# Patient Record
Sex: MALE | Race: WHITE | NOT HISPANIC OR LATINO | Employment: FULL TIME | URBAN - METROPOLITAN AREA
[De-identification: names, ages, dates, MRNs, and addresses within clinical notes are randomized per-mention and may not be internally consistent; named-entity substitution may affect disease eponyms.]

---

## 2022-04-07 ENCOUNTER — APPOINTMENT (EMERGENCY)
Dept: RADIOLOGY | Facility: HOSPITAL | Age: 57
End: 2022-04-07
Payer: OTHER MISCELLANEOUS

## 2022-04-07 ENCOUNTER — HOSPITAL ENCOUNTER (EMERGENCY)
Facility: HOSPITAL | Age: 57
Discharge: HOME | End: 2022-04-07
Attending: EMERGENCY MEDICINE
Payer: OTHER MISCELLANEOUS

## 2022-04-07 VITALS
OXYGEN SATURATION: 98 % | HEART RATE: 94 BPM | SYSTOLIC BLOOD PRESSURE: 196 MMHG | RESPIRATION RATE: 20 BRPM | DIASTOLIC BLOOD PRESSURE: 98 MMHG | TEMPERATURE: 97.8 F

## 2022-04-07 DIAGNOSIS — T14.90XA INHALATION INJURY: Primary | ICD-10-CM

## 2022-04-07 LAB
ANION GAP SERPL CALCULATED.3IONS-SCNC: 9 MMOL/L (ref 4–13)
BASOPHILS # BLD MANUAL: 0.16 THOUSAND/UL (ref 0–0.1)
BASOPHILS NFR MAR MANUAL: 2 % (ref 0–1)
BUN SERPL-MCNC: 26 MG/DL (ref 5–25)
CALCIUM SERPL-MCNC: 9.4 MG/DL (ref 8.3–10.1)
CARDIAC TROPONIN I PNL SERPL HS: 2 NG/L
CHLORIDE SERPL-SCNC: 105 MMOL/L (ref 100–108)
CO2 SERPL-SCNC: 27 MMOL/L (ref 21–32)
CREAT SERPL-MCNC: 1.3 MG/DL (ref 0.6–1.3)
EOSINOPHIL # BLD MANUAL: 0.41 THOUSAND/UL (ref 0–0.4)
EOSINOPHIL NFR BLD MANUAL: 5 % (ref 0–6)
ERYTHROCYTE [DISTWIDTH] IN BLOOD BY AUTOMATED COUNT: 13.3 % (ref 11.6–15.1)
GFR SERPL CREATININE-BSD FRML MDRD: 61 ML/MIN/1.73SQ M
GLUCOSE SERPL-MCNC: 108 MG/DL (ref 65–140)
HCT VFR BLD AUTO: 43.8 % (ref 36.5–49.3)
HGB BLD-MCNC: 14.8 G/DL (ref 12–17)
LYMPHOCYTES # BLD AUTO: 2.6 THOUSAND/UL (ref 0.6–4.47)
LYMPHOCYTES # BLD AUTO: 32 % (ref 14–44)
MCH RBC QN AUTO: 29.6 PG (ref 26.8–34.3)
MCHC RBC AUTO-ENTMCNC: 33.8 G/DL (ref 31.4–37.4)
MCV RBC AUTO: 88 FL (ref 82–98)
MONOCYTES # BLD AUTO: 0.89 THOUSAND/UL (ref 0–1.22)
MONOCYTES NFR BLD: 11 % (ref 4–12)
NEUTROPHILS # BLD MANUAL: 4.06 THOUSAND/UL (ref 1.85–7.62)
NEUTS BAND NFR BLD MANUAL: 2 % (ref 0–8)
NEUTS SEG NFR BLD AUTO: 48 % (ref 43–75)
PLATELET # BLD AUTO: 342 THOUSANDS/UL (ref 149–390)
PLATELET BLD QL SMEAR: ADEQUATE
PMV BLD AUTO: 9.8 FL (ref 8.9–12.7)
POTASSIUM SERPL-SCNC: 4.6 MMOL/L (ref 3.5–5.3)
RBC # BLD AUTO: 5 MILLION/UL (ref 3.88–5.62)
RBC MORPH BLD: NORMAL
SODIUM SERPL-SCNC: 141 MMOL/L (ref 136–145)
WBC # BLD AUTO: 8.11 THOUSAND/UL (ref 4.31–10.16)

## 2022-04-07 PROCEDURE — 85007 BL SMEAR W/DIFF WBC COUNT: CPT | Performed by: PHYSICIAN ASSISTANT

## 2022-04-07 PROCEDURE — 85027 COMPLETE CBC AUTOMATED: CPT | Performed by: PHYSICIAN ASSISTANT

## 2022-04-07 PROCEDURE — 71046 X-RAY EXAM CHEST 2 VIEWS: CPT

## 2022-04-07 PROCEDURE — 84484 ASSAY OF TROPONIN QUANT: CPT | Performed by: PHYSICIAN ASSISTANT

## 2022-04-07 PROCEDURE — 80048 BASIC METABOLIC PNL TOTAL CA: CPT | Performed by: PHYSICIAN ASSISTANT

## 2022-04-07 PROCEDURE — 99284 EMERGENCY DEPT VISIT MOD MDM: CPT

## 2022-04-07 PROCEDURE — 36415 COLL VENOUS BLD VENIPUNCTURE: CPT | Performed by: PHYSICIAN ASSISTANT

## 2022-04-07 PROCEDURE — 99285 EMERGENCY DEPT VISIT HI MDM: CPT | Performed by: PHYSICIAN ASSISTANT

## 2022-04-07 PROCEDURE — 93005 ELECTROCARDIOGRAM TRACING: CPT

## 2022-04-07 RX ORDER — ALBUTEROL SULFATE 90 UG/1
2 AEROSOL, METERED RESPIRATORY (INHALATION) ONCE
Status: COMPLETED | OUTPATIENT
Start: 2022-04-07 | End: 2022-04-07

## 2022-04-07 RX ADMIN — ALBUTEROL SULFATE 2 PUFF: 90 AEROSOL, METERED RESPIRATORY (INHALATION) at 09:05

## 2022-04-07 NOTE — ED PROVIDER NOTES
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 and does have difficulty lying  States that it is not believed his flare provided adequate PE  States that he felt like he was breathing and small particles and had a heaviness in his chest when he went home yesterday  Since then he had several episodes of coughing and 1 time this morning felt as if he were to vomit after coughing  States that he went to work today trying to finish the job that he developed a cough when he started sanding again  No fevers  Denies shortness of breath at rest       History provided by:  Patient   used: No        Prior to Admission Medications   Prescriptions Last Dose Informant Patient Reported? Taking? EPINEPHrine (EPIPEN) 0 3 mg/0 3 mL SOAJ   No Yes   Sig: Inject 0 3 mL into the shoulder, thigh, or buttocks once for 1 dose For severe allergic reaction      Facility-Administered Medications: None       History reviewed  No pertinent past medical history  Past Surgical History:   Procedure Laterality Date    FRACTURE SURGERY         History reviewed  No pertinent family history  I have reviewed and agree with the history as documented      E-Cigarette/Vaping    E-Cigarette Use Never User      E-Cigarette/Vaping Substances     Social History     Tobacco Use    Smoking status: Never Smoker    Smokeless tobacco: Never Used   Vaping Use    Vaping Use: Never used   Substance Use Topics    Alcohol use: Yes     Comment: beer    Drug use: Never       Review of Systems   Constitutional: Negative for activity change, appetite change, chills and fever  HENT: Negative for congestion, dental problem, drooling, ear discharge, ear pain, mouth sores, nosebleeds, rhinorrhea, sore throat and trouble swallowing  Eyes: Negative for pain, discharge and itching  Respiratory: Positive for chest tightness  Negative for cough, shortness of breath and wheezing  Cardiovascular: Negative for chest pain and palpitations  Gastrointestinal: Negative for abdominal pain, blood in stool, constipation, diarrhea, nausea and vomiting  Endocrine: Negative for cold intolerance and heat intolerance  Genitourinary: Negative for difficulty urinating, dysuria, flank pain, frequency and urgency  Skin: Negative for rash and wound  Allergic/Immunologic: Negative for food allergies and immunocompromised state  Neurological: Negative for dizziness, seizures, syncope, weakness, numbness and headaches  Psychiatric/Behavioral: Negative for agitation, behavioral problems and confusion  Physical Exam  Physical Exam  Vitals and nursing note reviewed  Constitutional:       General: He is not in acute distress  Appearance: He is not diaphoretic  HENT:      Head: Normocephalic and atraumatic  Right Ear: External ear normal       Left Ear: External ear normal    Eyes:      General: No scleral icterus  Conjunctiva/sclera: Conjunctivae normal    Neck:      Vascular: No JVD  Trachea: No tracheal deviation  Cardiovascular:      Rate and Rhythm: Normal rate and regular rhythm  Heart sounds: Normal heart sounds  No murmur heard  No friction rub  No gallop  Pulmonary:      Effort: Pulmonary effort is normal  No respiratory distress  Breath sounds: Normal breath sounds  No wheezing or rales  Chest:      Chest wall: No tenderness  Abdominal:      General: There is no distension  Musculoskeletal:         General: No tenderness or deformity  Normal range of motion     Lymphadenopathy:      Cervical: No cervical adenopathy  Skin:     General: Skin is warm and dry  Findings: No erythema or rash  Neurological:      Mental Status: He is alert and oriented to person, place, and time  Psychiatric:         Mood and Affect: Mood normal          Behavior: Behavior normal          Vital Signs  ED Triage Vitals   Temperature Pulse Respirations Blood Pressure SpO2   04/07/22 0817 04/07/22 0817 04/07/22 0817 04/07/22 0817 04/07/22 0817   97 8 °F (36 6 °C) 98 18 (!) 196/98 99 %      Temp Source Heart Rate Source Patient Position - Orthostatic VS BP Location FiO2 (%)   04/07/22 0817 04/07/22 0817 04/07/22 0817 04/07/22 0817 --   Oral Monitor Sitting Right arm       Pain Score       04/07/22 1025       No Pain           Vitals:    04/07/22 0817 04/07/22 0900 04/07/22 1000   BP: (!) 196/98     Pulse: 98 92 94   Patient Position - Orthostatic VS: Sitting           Visual Acuity      ED Medications  Medications   albuterol (PROVENTIL HFA,VENTOLIN HFA) inhaler 2 puff (2 puffs Inhalation Given 4/7/22 0905)       Diagnostic Studies  Results Reviewed     Procedure Component Value Units Date/Time    HS Troponin 0hr (reflex protocol) [[id number]]  (Normal) Collected: 04/07/22 0857    Lab Status: Final result Specimen: Blood from Arm, Right Updated: 04/07/22 0945     hs TnI 0hr 2 ng/L     CBC and differential [[id number]]  (Normal) Collected: 04/07/22 0857    Lab Status: Final result Specimen: Blood from Arm, Right Updated: 04/07/22 0940     WBC 8 11 Thousand/uL      RBC 5 00 Million/uL      Hemoglobin 14 8 g/dL      Hematocrit 43 8 %      MCV 88 fL      MCH 29 6 pg      MCHC 33 8 g/dL      RDW 13 3 %      MPV 9 8 fL      Platelets 291 Thousands/uL     Narrative: This is an appended report  These results have been appended to a previously verified report      Manual Differential(PHLEBS Do Not Order) [[id number]]  (Abnormal) Collected: 04/07/22 0857    Lab Status: Final result Specimen: Blood from Arm, Right Updated: 04/07/22 0940 Segmented % 48 %      Bands % 2 %      Lymphocytes % 32 %      Monocytes % 11 %      Eosinophils, % 5 %      Basophils % 2 %      Absolute Neutrophils 4 06 Thousand/uL      Lymphocytes Absolute 2 60 Thousand/uL      Monocytes Absolute 0 89 Thousand/uL      Eosinophils Absolute 0 41 Thousand/uL      Basophils Absolute 0 16 Thousand/uL      Total Counted --     RBC Morphology Normal     Platelet Estimate Adequate    Basic metabolic panel [[id number]]  (Abnormal) Collected: 04/07/22 0857    Lab Status: Final result Specimen: Blood from Arm, Right Updated: 04/07/22 0930     Sodium 141 mmol/L      Potassium 4 6 mmol/L      Chloride 105 mmol/L      CO2 27 mmol/L      ANION GAP 9 mmol/L      BUN 26 mg/dL      Creatinine 1 30 mg/dL      Glucose 108 mg/dL      Calcium 9 4 mg/dL      eGFR 61 ml/min/1 73sq m     Narrative:      [place] guidelines for Chronic Kidney Disease (CKD):     Stage 1 with normal or high GFR (GFR > 90 mL/min/1 73 square meters)    Stage 2 Mild CKD (GFR = 60-89 mL/min/1 73 square meters)    Stage 3A Moderate CKD (GFR = 45-59 mL/min/1 73 square meters)    Stage 3B Moderate CKD (GFR = 30-44 mL/min/1 73 square meters)    Stage 4 Severe CKD (GFR = 15-29 mL/min/1 73 square meters)    Stage 5 End Stage CKD (GFR <15 mL/min/1 73 square meters)  Note: GFR calculation is accurate only with a steady state creatinine                 XR chest 2 views   Final Result by Mckinley Petersen MD (04/07 0915)      No acute cardiopulmonary disease                    Workstation performed: [id number]                    Procedures  ECG 12 Lead Documentation Only    Date/Time: 4/7/2022 2:10 PM  Performed by: Ryan Castro PA-C  Authorized by: Ryan Castro PA-C     Indications / Diagnosis:  SOB   ECG reviewed by me, the ED Provider: yes    Patient location:  ED  Previous ECG:     Previous ECG:  Unavailable  Interpretation:     Interpretation: non-specific    Rate:     ECG rate:  94    ECG rate assessment: tachycardic    Rhythm:     Rhythm: sinus tachycardia    Ectopy:     Ectopy: none    QRS:     QRS axis:  Normal    QRS intervals:  Normal  Conduction:     Conduction: normal    ST segments:     ST segments:  Normal  T waves:     T waves: normal               ED Course             HEART Risk Score      Most Recent Value   Heart Score Risk Calculator    History 1 Filed at: 04/07/2022 0956   ECG 0 Filed at: 04/07/2022 5506   Age 1 Filed at: 04/07/2022 0956   Risk Factors 0 Filed at: 04/07/2022 0956   Troponin 0 Filed at: 04/07/2022 9921   HEART Score 2 Filed at: 04/07/2022 3807                                      MDM  Number of Diagnoses or Management Options  Inhalation injury  Diagnosis management comments: Differential diagnosis includes but not limited to:  Reactive airway, bronchospasm  Acute coronary syndrome less likely  Amount and/or Complexity of Data Reviewed  Clinical lab tests: ordered and reviewed  Tests in the radiology section of CPT®: reviewed and ordered  Independent visualization of images, tracings, or specimens: yes        Disposition  Final diagnoses:   Inhalation injury     Time reflects when diagnosis was documented in both MDM as applicable and the Disposition within this note     Time User Action Codes Description Comment    4/7/2022  9:57 AM Omar Bishop [[id number]] Inhalation injury       ED Disposition     ED Disposition Condition Date/Time Comment    Discharge Stable Thu Apr 7, 2022  9:56 AM Maggie Hernández discharge to home/self care  Follow-up Information     Follow up With Specialties Details Why Almas Price MD Internal Medicine   [address] [facility] [address] [facility]  [phone]            Discharge Medication List as of 4/7/2022 10:00 AM      CONTINUE these medications which have NOT CHANGED    Details   EPINEPHrine (EPIPEN) 0 3 mg/0 3 mL SOAJ Inject 0 3 mL into the shoulder, thigh, or buttocks once for 1 dose For severe allergic reaction, Starting Thu 10/13/2016, Print             No discharge procedures on file      PDMP Review     None          ED Provider  Electronically Signed by           Luis Antonio Pichardo PA-C  04/07/22 6035

## 2022-04-07 NOTE — Clinical Note
Susan Amin was seen and treated in our emergency department on 4/7/2022  Diagnosis:     Jerry Larios  may return to work on return date  He may return on this date: 04/11/2022         If you have any questions or concerns, please don't hesitate to call        Daryle Hackney, PA-C    ______________________________           _______________          _______________  Hospital Representative                              Date                                Time

## 2022-04-08 LAB
ATRIAL RATE: 98 BPM
P AXIS: 66 DEGREES
PR INTERVAL: 142 MS
QRS AXIS: 18 DEGREES
QRSD INTERVAL: 98 MS
QT INTERVAL: 334 MS
QTC INTERVAL: 418 MS
T WAVE AXIS: 10 DEGREES
VENTRICULAR RATE: 94 BPM

## 2022-04-08 PROCEDURE — 93010 ELECTROCARDIOGRAM REPORT: CPT | Performed by: INTERNAL MEDICINE

## 2022-04-10 ENCOUNTER — APPOINTMENT (OUTPATIENT)
Dept: URGENT CARE | Age: 57
End: 2022-04-10
Payer: COMMERCIAL

## 2022-04-10 DIAGNOSIS — T14.90XA INHALATION INJURY: Primary | ICD-10-CM

## 2022-04-10 PROCEDURE — 99213 OFFICE O/P EST LOW 20 MIN: CPT

## 2022-04-10 RX ORDER — ALBUTEROL SULFATE 2.5 MG/3ML
2.5 SOLUTION RESPIRATORY (INHALATION) ONCE
Status: SHIPPED | OUTPATIENT
Start: 2022-04-10

## 2022-04-10 RX ORDER — SODIUM CHLORIDE FOR INHALATION 0.9 %
3 VIAL, NEBULIZER (ML) INHALATION ONCE
Status: SHIPPED | OUTPATIENT
Start: 2022-04-10

## 2022-06-13 ENCOUNTER — APPOINTMENT (OUTPATIENT)
Dept: URGENT CARE | Age: 57
End: 2022-06-13
Payer: OTHER MISCELLANEOUS

## 2022-06-13 PROCEDURE — 99213 OFFICE O/P EST LOW 20 MIN: CPT | Performed by: NURSE PRACTITIONER

## 2022-06-23 ENCOUNTER — CONSULT (OUTPATIENT)
Dept: PULMONOLOGY | Facility: CLINIC | Age: 57
End: 2022-06-23
Payer: OTHER MISCELLANEOUS

## 2022-06-23 VITALS
DIASTOLIC BLOOD PRESSURE: 80 MMHG | BODY MASS INDEX: 27.97 KG/M2 | TEMPERATURE: 97.9 F | WEIGHT: 178.2 LBS | HEART RATE: 71 BPM | HEIGHT: 67 IN | OXYGEN SATURATION: 99 % | SYSTOLIC BLOOD PRESSURE: 150 MMHG

## 2022-06-23 DIAGNOSIS — J68.3 REACTIVE AIRWAYS DYSFUNCTION SYNDROME (HCC): Primary | ICD-10-CM

## 2022-06-23 PROCEDURE — 99204 OFFICE O/P NEW MOD 45 MIN: CPT | Performed by: INTERNAL MEDICINE

## 2022-06-23 RX ORDER — ALBUTEROL SULFATE 90 UG/1
AEROSOL, METERED RESPIRATORY (INHALATION)
COMMUNITY
Start: 2022-06-13 | End: 2022-08-03 | Stop reason: SDUPTHER

## 2022-06-25 PROBLEM — J68.3 REACTIVE AIRWAYS DYSFUNCTION SYNDROME (HCC): Status: ACTIVE | Noted: 2022-06-25

## 2022-06-25 NOTE — PROGRESS NOTES
Pulmonary Consultation   Mariela Fidelia Hernández 49 y o  male MRN: [id number]  6/25/2022    Assessment:    Reactive airways dysfunction syndrome ([facility] )  Jasbir Chavez reports the sudden but persistent onset of respiratory difficulties after exposure to metal particles, dust, and debris  This seems likely to be reactive airways dysfunction syndrome  · Trial Breo 200/25  · Continue to use rescue inhalers as needed  · Collect PFTs    Plan:    Diagnoses and all orders for this visit:    Reactive airways dysfunction syndrome ([facility] )  -     Complete PFT with post bronchodilator; Future  -     fluticasone-vilanterol (Breo Ellipta) 200-25 MCG/INH inhaler; Inhale 1 puff daily Rinse mouth after use  Other orders  -     albuterol (PROVENTIL HFA,VENTOLIN HFA) 90 mcg/act inhaler; TAKE 2 PUFFS BY MOUTH EVERY 4 TO 6 HOURS AS NEEDED      No follow-ups on file  History of Present Illness   HPI:  Cindy Lau is a 49 y o  male who presents for evaluation of sudden onset dyspnea with exertion  He reports 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, 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  He previously was able to bike 8-10 miles for exercise and now finds himself short of breath just climbing half a flight of stairs  This has been persistent since the initial presentation and has not really worsened or gotten better since that time  He has tried albuterol which has helped a bit, but he still finds he can recover likely prior to the rescue inhaler becoming effective  He does report wheezing and a dry cough as well  He has no other significant medical history and is in good cardiovascular health otherwise  No major surgeries  He never smoked  There is no significant family history reported  Review of Systems   Respiratory: Positive for cough and shortness of breath  All other systems reviewed and are negative  Historical Information   History reviewed   No pertinent past medical history  Past Surgical History:   Procedure Laterality Date    FRACTURE SURGERY       History reviewed  No pertinent family history  Meds/Allergies     Current Outpatient Medications:     albuterol (PROVENTIL HFA,VENTOLIN HFA) 90 mcg/act inhaler, TAKE 2 PUFFS BY MOUTH EVERY 4 TO 6 HOURS AS NEEDED, Disp: , Rfl:     fluticasone-vilanterol (Breo Ellipta) 200-25 MCG/INH inhaler, Inhale 1 puff daily Rinse mouth after use , Disp: 60 blister, Rfl: 2    EPINEPHrine (EPIPEN) 0 3 mg/0 3 mL SOAJ, Inject 0 3 mL into the shoulder, thigh, or buttocks once for 1 dose For severe allergic reaction, Disp: 1 each, Rfl: 1    Current Facility-Administered Medications:     albuterol inhalation solution 2 5 mg, 2 5 mg, Nebulization, Once, Trevor Gerold, CRNP    sodium chloride 0 9 % inhalation solution 3 mL, 3 mL, Nebulization, Once, Trevor Gerold, CRNP  Allergies   Allergen Reactions    Penicillins        Vitals: Blood pressure 150/80, pulse 71, temperature 97 9 °F (36 6 °C), height 5' 7" (1 702 m), weight 80 8 kg (178 lb 3 2 oz), SpO2 99 %  Body mass index is 27 91 kg/m²  Oxygen Therapy  SpO2: 99 %  Oxygen Therapy: None (Room air)    Physical Exam  Physical Exam  Vitals reviewed  Constitutional:       General: He is not in acute distress  Appearance: Normal appearance  He is well-developed  He is not ill-appearing  HENT:      Head: Normocephalic and atraumatic  Eyes:      General: No scleral icterus  Conjunctiva/sclera: Conjunctivae normal    Neck:      Thyroid: No thyromegaly  Vascular: No JVD  Cardiovascular:      Rate and Rhythm: Normal rate and regular rhythm  Heart sounds: Normal heart sounds  No murmur heard  No friction rub  No gallop  Pulmonary:      Effort: Pulmonary effort is normal  No respiratory distress  Breath sounds: Normal breath sounds  No wheezing or rales  Musculoskeletal:      Cervical back: Neck supple  Right lower leg: No edema  Left lower leg: No edema  Skin:     General: Skin is warm and dry  Findings: No rash  Neurological:      General: No focal deficit present  Mental Status: He is alert and oriented to person, place, and time  Mental status is at baseline  Psychiatric:         Mood and Affect: Mood normal          Behavior: Behavior normal      Labs: I have personally reviewed pertinent lab results  Lab Results   Component Value Date    WBC 8 11 04/07/2022    HGB 14 8 04/07/2022    HCT 43 8 04/07/2022    MCV 88 04/07/2022     04/07/2022     Lab Results   Component Value Date    CALCIUM 9 4 04/07/2022    K 4 6 04/07/2022    CO2 27 04/07/2022     04/07/2022    BUN 26 (H) 04/07/2022    CREATININE 1 30 04/07/2022     No results found for: IGE  No results found for: ALT, AST, GGT, ALKPHOS, BILITOT    Imaging and other studies: I have personally reviewed pertinent films in PACS    EKG, Pathology, and Other Studies: I have personally reviewed pertinent reports  [facility]

## 2022-06-25 NOTE — ASSESSMENT & PLAN NOTE
Walter Colvin reports the sudden but persistent onset of respiratory difficulties after exposure to metal particles, dust, and debris  This seems likely to be reactive airways dysfunction syndrome      · Trial Breo 200/25  · Continue to use rescue inhalers as needed  · Collect PFTs

## 2022-07-13 ENCOUNTER — HOSPITAL ENCOUNTER (OUTPATIENT)
Dept: PULMONOLOGY | Facility: HOSPITAL | Age: 57
Discharge: HOME | End: 2022-07-13
Attending: INTERNAL MEDICINE
Payer: COMMERCIAL

## 2022-07-13 DIAGNOSIS — J68.3 REACTIVE AIRWAYS DYSFUNCTION SYNDROME (HCC): ICD-10-CM

## 2022-07-13 PROCEDURE — 94726 PLETHYSMOGRAPHY LUNG VOLUMES: CPT

## 2022-07-13 PROCEDURE — 94729 DIFFUSING CAPACITY: CPT | Performed by: INTERNAL MEDICINE

## 2022-07-13 PROCEDURE — 94726 PLETHYSMOGRAPHY LUNG VOLUMES: CPT | Performed by: INTERNAL MEDICINE

## 2022-07-13 PROCEDURE — 94060 EVALUATION OF WHEEZING: CPT

## 2022-07-13 PROCEDURE — 94060 EVALUATION OF WHEEZING: CPT | Performed by: INTERNAL MEDICINE

## 2022-07-13 PROCEDURE — 94760 N-INVAS EAR/PLS OXIMETRY 1: CPT

## 2022-07-13 PROCEDURE — 94729 DIFFUSING CAPACITY: CPT

## 2022-07-13 RX ORDER — ALBUTEROL SULFATE 2.5 MG/3ML
2.5 SOLUTION RESPIRATORY (INHALATION) ONCE
Status: COMPLETED | OUTPATIENT
Start: 2022-07-13 | End: 2022-07-13

## 2022-07-13 RX ADMIN — ALBUTEROL SULFATE 2.5 MG: 2.5 SOLUTION RESPIRATORY (INHALATION) at 16:14

## 2022-08-03 ENCOUNTER — OFFICE VISIT (OUTPATIENT)
Dept: PULMONOLOGY | Facility: CLINIC | Age: 57
End: 2022-08-03
Payer: COMMERCIAL

## 2022-08-03 VITALS
SYSTOLIC BLOOD PRESSURE: 150 MMHG | WEIGHT: 181 LBS | HEIGHT: 67 IN | RESPIRATION RATE: 12 BRPM | HEART RATE: 92 BPM | TEMPERATURE: 97.8 F | BODY MASS INDEX: 28.41 KG/M2 | DIASTOLIC BLOOD PRESSURE: 92 MMHG | OXYGEN SATURATION: 96 %

## 2022-08-03 DIAGNOSIS — J68.3 REACTIVE AIRWAYS DYSFUNCTION SYNDROME (HCC): Primary | ICD-10-CM

## 2022-08-03 PROCEDURE — 99214 OFFICE O/P EST MOD 30 MIN: CPT | Performed by: INTERNAL MEDICINE

## 2022-08-03 RX ORDER — ALBUTEROL SULFATE 90 UG/1
2 AEROSOL, METERED RESPIRATORY (INHALATION) EVERY 4 HOURS PRN
Qty: 18 G | Refills: 5 | Status: SHIPPED | OUTPATIENT
Start: 2022-08-03

## 2022-08-03 NOTE — PROGRESS NOTES
Pulmonary Follow Up Note   Jeoffrey Holter Doddy 56 y o  male MRN: [id number]  8/4/2022    Assessment:    Reactive airways dysfunction syndrome [facility])  He reports he is continuing to improve with Breo but that he hasn't fully returned to baseline  His PFTs have some abnormalities that I can't fully explain, namely the restrictive disease, but I suspect the diffusion defect was due to difficulty with the testing  Continue Breo and albuterol  Advised to use albuterol 10 minutes before activity  If he continues to improve, will stay the course  If he does not, plan on high resolution CT scan  Follow up 3-4 months    Plan:    Diagnoses and all orders for this visit:    Reactive airways dysfunction syndrome ([facility])  -     albuterol (PROVENTIL HFA,VENTOLIN HFA) 90 mcg/act inhaler; Inhale 2 puffs every 4 (four) hours as needed for wheezing  -     fluticasone-vilanterol (Breo Ellipta) 200-25 MCG/INH inhaler; Inhale 1 puff daily Rinse mouth after use  Return in about 4 months (around 12/3/2022)  History of Present Illness   HPI:  Cindy Muro is a 56 y o  male who presents for scheduled follow up  He feels like he has been improving on Breo  He is biking and has increased ability to tolerate exercise, but he is not back to his baseline yet  He has not been using the albuterol preventatively to date but was counseled on doing so  Overall, things are steadily improving  We discussed the somewhat unpredictable nature of reactive airways dysfunction syndrome, and that it was not clear this is his only diagnosis  Whether any fibrotic or scarring processes may occur secondary to his exposure is not yet clear, but as long as he continued to improve we agreed to hold off on additional testing  He had no other acute complaints  Review of Systems   Respiratory: Positive for shortness of breath  Negative for cough and wheezing  All other systems reviewed and are negative      Historical Information   No past medical history on file  Past Surgical History:   Procedure Laterality Date    FRACTURE SURGERY       No family history on file  Meds/Allergies     Current Outpatient Medications:     albuterol (PROVENTIL HFA,VENTOLIN HFA) 90 mcg/act inhaler, Inhale 2 puffs every 4 (four) hours as needed for wheezing, Disp: 18 g, Rfl: 5    fluticasone-vilanterol (Breo Ellipta) 200-25 MCG/INH inhaler, Inhale 1 puff daily Rinse mouth after use , Disp: 60 blister, Rfl: 5    EPINEPHrine (EPIPEN) 0 3 mg/0 3 mL SOAJ, Inject 0 3 mL into the shoulder, thigh, or buttocks once for 1 dose For severe allergic reaction (Patient not taking: Reported on 8/3/2022), Disp: 1 each, Rfl: 1    Current Facility-Administered Medications:     albuterol inhalation solution 2 5 mg, 2 5 mg, Nebulization, Once, Lyndsey Yvonne, CRNP    sodium chloride 0 9 % inhalation solution 3 mL, 3 mL, Nebulization, Once, Lyndsey Yvonne, CRNP  Allergies   Allergen Reactions    Penicillins        Vitals: Blood pressure 150/92, pulse 92, temperature 97 8 °F (36 6 °C), temperature source Tympanic, resp  rate 12, height 5' 7" (1 702 m), weight 82 1 kg (181 lb), SpO2 96 %  Body mass index is 28 35 kg/m²  Oxygen Therapy  SpO2: 96 %    Physical Exam  Physical Exam  Vitals reviewed  Constitutional:       General: He is not in acute distress  Appearance: Normal appearance  He is well-developed  He is not ill-appearing  HENT:      Head: Normocephalic and atraumatic  Eyes:      General: No scleral icterus  Conjunctiva/sclera: Conjunctivae normal    Neck:      Thyroid: No thyromegaly  Vascular: No JVD  Cardiovascular:      Rate and Rhythm: Normal rate and regular rhythm  Heart sounds: Normal heart sounds  No murmur heard  No friction rub  No gallop  Pulmonary:      Effort: Pulmonary effort is normal  No respiratory distress  Breath sounds: Normal breath sounds  No wheezing or rales  Musculoskeletal:      Cervical back: Neck supple        Right lower leg: No edema  Left lower leg: No edema  Skin:     General: Skin is warm and dry  Findings: No rash  Neurological:      General: No focal deficit present  Mental Status: He is alert and oriented to person, place, and time  Mental status is at baseline  Psychiatric:         Mood and Affect: Mood normal          Behavior: Behavior normal      Labs: I have personally reviewed pertinent lab results  Lab Results   Component Value Date    WBC 8 11 04/07/2022    HGB 14 8 04/07/2022    HCT 43 8 04/07/2022    MCV 88 04/07/2022     04/07/2022     Lab Results   Component Value Date    CALCIUM 9 4 04/07/2022    K 4 6 04/07/2022    CO2 27 04/07/2022     04/07/2022    BUN 26 (H) 04/07/2022    CREATININE 1 30 04/07/2022     No results found for: IGE  No results found for: ALT, AST, GGT, ALKPHOS, BILITOT    Imaging and other studies: I have personally reviewed pertinent films in PACS    EKG, Pathology, and Other Studies: I have personally reviewed pertinent reports  [facility]

## 2022-08-03 NOTE — ASSESSMENT & PLAN NOTE
He reports he is continuing to improve with Breo but that he hasn't fully returned to baseline  His PFTs have some abnormalities that I can't fully explain, namely the restrictive disease, but I suspect the diffusion defect was due to difficulty with the testing        · Continue Breo and albuterol  · Advised to use albuterol 10 minutes before activity  · If he continues to improve, will stay the course  · If he does not, plan on high resolution CT scan  · Follow up 3-4 months

## 2022-11-15 ENCOUNTER — OFFICE VISIT (OUTPATIENT)
Dept: PULMONOLOGY | Facility: CLINIC | Age: 57
End: 2022-11-15

## 2022-11-15 VITALS
HEART RATE: 97 BPM | DIASTOLIC BLOOD PRESSURE: 92 MMHG | SYSTOLIC BLOOD PRESSURE: 172 MMHG | BODY MASS INDEX: 29.13 KG/M2 | TEMPERATURE: 98.2 F | OXYGEN SATURATION: 95 % | WEIGHT: 186 LBS

## 2022-11-15 DIAGNOSIS — J68.3 REACTIVE AIRWAYS DYSFUNCTION SYNDROME (HCC): Primary | ICD-10-CM

## 2022-11-15 NOTE — ASSESSMENT & PLAN NOTE
He reports being unable to get to his prior baseline  Will proceed to HRCT to assess for structural lung damage given the abnormal findings on PFTs and his acute as well as chronic exposure to dusts which can cause pneumoconioses

## 2022-11-15 NOTE — PROGRESS NOTES
Pulmonary Follow Up Note   Kathia Hernández 56 y o  male MRN: [id number]  11/15/2022    Assessment:    Reactive airways dysfunction syndrome [facility])  He reports being unable to get to his prior baseline  Will proceed to HRCT to assess for structural lung damage given the abnormal findings on PFTs and his acute as well as chronic exposure to dusts which can cause pneumoconioses  Plan:    Diagnoses and all orders for this visit:    [facility]  -     CT chest high resolution; Future  -     Timing of follow up dependent upon results of HRCT; anticipate 6 months if abnormalities not seen, sooner if they are    Follow up timing TBD  History of Present Illness   HPI:  Abigail Do is a 56 y o  male who presents for routine follow-up  Unfortunately, he is not been able to return to his previous baseline  He is able to ride his bike for a total distance of about 6 miles but can not get to the 8-10 miles he used to be able to do regularly  He is limited primarily by shortness of breath  He is stopped taking the Breo and the albuterol  He did not notice any significant change either starting or discontinuing the Breo, and the albuterol fairly routinely caused a significant burning sensation in his lungs during exercise  He has not had any changes in his breathing despite stopping those medications  He continues not to have any sort of persistent cough  He does feel he has some mucus from time to time but attributes that to the high quantity of respiratory viruses out in the community right now  No other acute complaints  Review of Systems   Respiratory: Positive for shortness of breath  Negative for cough  All other systems reviewed and are negative  Historical Information   History reviewed  No pertinent past medical history  Past Surgical History:   Procedure Laterality Date   • FRACTURE SURGERY       History reviewed   No pertinent family history  Meds/Allergies   No current outpatient medications on file  No current facility-administered medications for this visit  Allergies   Allergen Reactions   • Penicillins        Vitals: Blood pressure (!) 172/92, pulse 97, temperature 98 2 °F (36 8 °C), weight 84 4 kg (186 lb), SpO2 95 %  Body mass index is 29 13 kg/m²  Oxygen Therapy  SpO2: 95 %    Physical Exam  Physical Exam  Vitals reviewed  Constitutional:       General: He is not in acute distress  Appearance: Normal appearance  He is well-developed  He is not ill-appearing  HENT:      Head: Normocephalic and atraumatic  Eyes:      General: No scleral icterus  Conjunctiva/sclera: Conjunctivae normal    Neck:      Thyroid: No thyromegaly  Vascular: No JVD  Cardiovascular:      Rate and Rhythm: Normal rate and regular rhythm  Heart sounds: Normal heart sounds  No murmur heard  No friction rub  No gallop  Pulmonary:      Effort: Pulmonary effort is normal  No respiratory distress  Breath sounds: Normal breath sounds  No wheezing or rales  Musculoskeletal:      Cervical back: Neck supple  Right lower leg: No edema  Left lower leg: No edema  Skin:     General: Skin is warm and dry  Findings: No rash  Neurological:      General: No focal deficit present  Mental Status: He is alert and oriented to person, place, and time  Mental status is at baseline  Psychiatric:         Mood and Affect: Mood normal          Behavior: Behavior normal      Labs: I have personally reviewed pertinent lab results    Lab Results   Component Value Date    WBC 8 11 04/07/2022    HGB 14 8 04/07/2022    HCT 43 8 04/07/2022    MCV 88 04/07/2022     04/07/2022     Lab Results   Component Value Date    CALCIUM 9 4 04/07/2022    K 4 6 04/07/2022    CO2 27 04/07/2022     04/07/2022    BUN 26 (H) 04/07/2022    CREATININE 1 30 04/07/2022     No results found for: IGE  No results found for: ALT, AST, GGT, ALKPHOS, BILITOT    Imaging and other studies: I have personally reviewed relevant films in PACS  EKG, Pathology, and Other Studies: I have personally reviewed relevant reports in [facility]

## 2022-11-30 ENCOUNTER — HOSPITAL ENCOUNTER (OUTPATIENT)
Dept: RADIOLOGY | Facility: HOSPITAL | Age: 57
Discharge: HOME | End: 2022-11-30
Attending: INTERNAL MEDICINE

## 2022-11-30 DIAGNOSIS — J68.3 REACTIVE AIRWAYS DYSFUNCTION SYNDROME (HCC): ICD-10-CM

## 2023-01-02 ENCOUNTER — HOSPITAL ENCOUNTER (OUTPATIENT)
Dept: CT IMAGING | Facility: HOSPITAL | Age: 58
Discharge: HOME | End: 2023-01-02
Attending: INTERNAL MEDICINE

## 2023-01-02 DIAGNOSIS — J68.3 REACTIVE AIRWAYS DYSFUNCTION SYNDROME (HCC): ICD-10-CM

## 2023-05-10 ENCOUNTER — TELEPHONE (OUTPATIENT)
Dept: PULMONOLOGY | Facility: CLINIC | Age: 58
End: 2023-05-10

## 2023-05-10 NOTE — TELEPHONE ENCOUNTER
Called and left message for patient to schedule a follow up appointment, due in May, with Dr Ever Andres or an AP at our [facility] office

## 2025-03-16 ENCOUNTER — APPOINTMENT (EMERGENCY)
Dept: RADIOLOGY | Facility: HOSPITAL | Age: 60
End: 2025-03-16
Payer: COMMERCIAL

## 2025-03-16 ENCOUNTER — HOSPITAL ENCOUNTER (EMERGENCY)
Facility: HOSPITAL | Age: 60
Discharge: HOME | End: 2025-03-17
Attending: EMERGENCY MEDICINE | Admitting: EMERGENCY MEDICINE
Payer: COMMERCIAL

## 2025-03-16 VITALS
DIASTOLIC BLOOD PRESSURE: 71 MMHG | WEIGHT: 170 LBS | RESPIRATION RATE: 16 BRPM | HEART RATE: 71 BPM | OXYGEN SATURATION: 98 % | BODY MASS INDEX: 26.63 KG/M2 | SYSTOLIC BLOOD PRESSURE: 149 MMHG | TEMPERATURE: 98.2 F

## 2025-03-16 DIAGNOSIS — M54.9 BACK PAIN: ICD-10-CM

## 2025-03-16 DIAGNOSIS — M79.672 FOOT PAIN, LEFT: Primary | ICD-10-CM

## 2025-03-16 LAB
2HR DELTA HS TROPONIN: 0 NG/L
ALBUMIN SERPL BCG-MCNC: 4 G/DL (ref 3.5–5)
ALP SERPL-CCNC: 62 U/L (ref 34–104)
ALT SERPL W P-5'-P-CCNC: 37 U/L (ref 7–52)
ANION GAP SERPL CALCULATED.3IONS-SCNC: 8 MMOL/L (ref 4–13)
AST SERPL W P-5'-P-CCNC: 24 U/L (ref 13–39)
BASOPHILS # BLD AUTO: 0.02 THOUSANDS/ÂΜL (ref 0–0.1)
BASOPHILS NFR BLD AUTO: 0 % (ref 0–1)
BILIRUB SERPL-MCNC: 0.33 MG/DL (ref 0.2–1)
BUN SERPL-MCNC: 21 MG/DL (ref 5–25)
CALCIUM SERPL-MCNC: 9.2 MG/DL (ref 8.4–10.2)
CARDIAC TROPONIN I PNL SERPL HS: 3 NG/L (ref ?–50)
CARDIAC TROPONIN I PNL SERPL HS: 3 NG/L (ref ?–50)
CHLORIDE SERPL-SCNC: 105 MMOL/L (ref 96–108)
CO2 SERPL-SCNC: 25 MMOL/L (ref 21–32)
CREAT SERPL-MCNC: 1.11 MG/DL (ref 0.6–1.3)
CRP SERPL QL: 97.2 MG/L
EOSINOPHIL # BLD AUTO: 0.27 THOUSAND/ÂΜL (ref 0–0.61)
EOSINOPHIL NFR BLD AUTO: 3 % (ref 0–6)
ERYTHROCYTE [DISTWIDTH] IN BLOOD BY AUTOMATED COUNT: 13.5 % (ref 11.6–15.1)
ERYTHROCYTE [SEDIMENTATION RATE] IN BLOOD: 64 MM/HOUR (ref 0–19)
GFR SERPL CREATININE-BSD FRML MDRD: 73 ML/MIN/1.73SQ M
GLUCOSE SERPL-MCNC: 114 MG/DL (ref 65–140)
HCT VFR BLD AUTO: 38.7 % (ref 36.5–49.3)
HGB BLD-MCNC: 13 G/DL (ref 12–17)
IMM GRANULOCYTES # BLD AUTO: 0.04 THOUSAND/UL (ref 0–0.2)
IMM GRANULOCYTES NFR BLD AUTO: 1 % (ref 0–2)
LYMPHOCYTES # BLD AUTO: 1.98 THOUSANDS/ÂΜL (ref 0.6–4.47)
LYMPHOCYTES NFR BLD AUTO: 23 % (ref 14–44)
MCH RBC QN AUTO: 30 PG (ref 26.8–34.3)
MCHC RBC AUTO-ENTMCNC: 33.6 G/DL (ref 31.4–37.4)
MCV RBC AUTO: 89 FL (ref 82–98)
MONOCYTES # BLD AUTO: 0.99 THOUSAND/ÂΜL (ref 0.17–1.22)
MONOCYTES NFR BLD AUTO: 12 % (ref 4–12)
NEUTROPHILS # BLD AUTO: 5.34 THOUSANDS/ÂΜL (ref 1.85–7.62)
NEUTS SEG NFR BLD AUTO: 61 % (ref 43–75)
NRBC BLD AUTO-RTO: 0 /100 WBCS
PLATELET # BLD AUTO: 247 THOUSANDS/UL (ref 149–390)
PMV BLD AUTO: 9.4 FL (ref 8.9–12.7)
POTASSIUM SERPL-SCNC: 3.9 MMOL/L (ref 3.5–5.3)
PROT SERPL-MCNC: 7.2 G/DL (ref 6.4–8.4)
RBC # BLD AUTO: 4.33 MILLION/UL (ref 3.88–5.62)
SODIUM SERPL-SCNC: 138 MMOL/L (ref 135–147)
WBC # BLD AUTO: 8.64 THOUSAND/UL (ref 4.31–10.16)

## 2025-03-16 PROCEDURE — 84484 ASSAY OF TROPONIN QUANT: CPT

## 2025-03-16 PROCEDURE — 99283 EMERGENCY DEPT VISIT LOW MDM: CPT

## 2025-03-16 PROCEDURE — 86140 C-REACTIVE PROTEIN: CPT

## 2025-03-16 PROCEDURE — 80053 COMPREHEN METABOLIC PANEL: CPT

## 2025-03-16 PROCEDURE — 85025 COMPLETE CBC W/AUTO DIFF WBC: CPT

## 2025-03-16 PROCEDURE — 85652 RBC SED RATE AUTOMATED: CPT

## 2025-03-16 PROCEDURE — 36415 COLL VENOUS BLD VENIPUNCTURE: CPT

## 2025-03-16 PROCEDURE — 99284 EMERGENCY DEPT VISIT MOD MDM: CPT | Performed by: EMERGENCY MEDICINE

## 2025-03-16 PROCEDURE — 73630 X-RAY EXAM OF FOOT: CPT

## 2025-03-16 PROCEDURE — 93005 ELECTROCARDIOGRAM TRACING: CPT

## 2025-03-16 PROCEDURE — 86618 LYME DISEASE ANTIBODY: CPT | Performed by: EMERGENCY MEDICINE

## 2025-03-16 PROCEDURE — 96374 THER/PROPH/DIAG INJ IV PUSH: CPT

## 2025-03-16 RX ORDER — PREDNISONE 10 MG/1
TABLET ORAL
Qty: 31 TABLET | Refills: 0 | Status: SHIPPED | OUTPATIENT
Start: 2025-03-16 | End: 2025-03-26

## 2025-03-16 RX ORDER — ACETAMINOPHEN 10 MG/ML
1000 INJECTION, SOLUTION INTRAVENOUS ONCE
Status: COMPLETED | OUTPATIENT
Start: 2025-03-16 | End: 2025-03-16

## 2025-03-16 RX ADMIN — ACETAMINOPHEN 1000 MG: 10 INJECTION INTRAVENOUS at 22:07

## 2025-03-16 NOTE — Clinical Note
Dandy Hernández was seen and treated in our emergency department on 3/16/2025.    No restrictions            Diagnosis:     Dandy  may return to work on return date.    He may return on this date: 03/18/2025         If you have any questions or concerns, please don't hesitate to call.      Brad Starks MD    ______________________________           _______________          _______________  Hospital Representative                              Date                                Time

## 2025-03-17 ENCOUNTER — TELEPHONE (OUTPATIENT)
Age: 60
End: 2025-03-17

## 2025-03-17 LAB
ATRIAL RATE: 85 BPM
B BURGDOR IGG+IGM SER QL IA: NEGATIVE
P AXIS: 66 DEGREES
PR INTERVAL: 144 MS
QRS AXIS: -1 DEGREES
QRSD INTERVAL: 102 MS
QT INTERVAL: 344 MS
QTC INTERVAL: 409 MS
T WAVE AXIS: 27 DEGREES
VENTRICULAR RATE: 85 BPM

## 2025-03-17 PROCEDURE — 93010 ELECTROCARDIOGRAM REPORT: CPT | Performed by: INTERNAL MEDICINE

## 2025-03-17 NOTE — ED ATTENDING ATTESTATION
3/16/2025  I, Pearl Aguirre MD, saw and evaluated the patient. I have discussed the patient with the resident/non-physician practitioner and agree with the resident's/non-physician practitioner's findings, Plan of Care, and MDM as documented in the resident's/non-physician practitioner's note, except where noted. All available labs and Radiology studies were reviewed.  I was present for key portions of any procedure(s) performed by the resident/non-physician practitioner and I was immediately available to provide assistance.       At this point I agree with the current assessment done in the Emergency Department.  I have conducted an independent evaluation of this patient a history and physical is as follows:    Patient is a 59-year-old male with history of gout who presents to the emergency department for evaluation with ongoing discomfort in the left foot (MTP region).  He has been treated twice for gout flares within the last month and completed a prednisone taper 4 days ago with good relief.  Over the last couple of days he has had recurrence of discomfort in that left MTP region as well as some achiness in other joints including his wrists and right MTP.  He has not visualized swelling or appreciated redness at other sites.  He describes with both of these flares he has felt as though he might be coming down with something.  He did have some chills and sweats overnight without any other more specific symptoms.  No nasal congestion, sore throat, cough, abdominal discomfort, nausea, vomiting, bowel or urinary abnormality.  Home flu and COVID testing were negative last night.    The gout does typically affect left first MTP and he experiences approximately annual flares.  He reports having made several dietary changes and intentionally staying very well-hydrated to minimize flares.  Strong family history of gout with father and brother experiencing same.  He has been on a variety of  medications/anti-inflammatories recently and had greatest relief with the prednisone.    A couple of days ago he did notice a pulling sensation in his left mid back.  He described a tightening/spasm sensation and attributed this to walking with slightly different posture as a result of foot pain.  Spasm sensation persists although is milder.  Denies having appreciated any discomfort in the chest, dyspnea or lightheadedness.    With regard to blood pressure he notes that on occasion it is a bit elevated with systolic in the 140s.  Last night systolic was in the 120s with home cuff.  He and wife note that it has never been elevated to the degree it is presently.  No known history of cardiac problems.  History of tick bite last summer.  No rash following this.     On exam he appears mildly uncomfortable resting in stretcher.  Left first MTP erythematous and swollen.  Tender to light touch.  No rash.  Good sensation in all toes.  Cap refill normal.  No swelling appreciated of wrists.  Heart sounds regular.  Lungs clear to auscultation bilaterally.  No tenderness to palpation over the thoracic region.    With regard to left first MTP swelling/inflammation do have high suspicion for gout although unclear why this has given him much more trouble recently than usual.  With other involved joints must consider other polyarthropathies including Lyme especially given endemic location and known history of tick bite.  Fortunately he does appear well overall.  Does not appear to have septic joints.  He is not febrile.  Obtaining inflammatory markers which may be helpful in excluding inflammatory arthropathy if negative and/or be helpful for trending based on symptoms for further evaluation.  Lyme testing pending.  Obtaining x-ray in consideration of fracture or other pathology.    Blood pressure elevated in the setting of acute ED visit and discomfort.  Checking EKG and troponin and consideration of possible association although  spasm most likely based on his description.  If troponin is normal-ACS excluded given continuous nature of this discomfort.  Will recheck blood pressure again.  Hesitate to treat as he reported systolic pressure of 120s yesterday and risks involved with hypotension.  Will require recheck with PCP.      ED Course  ED Course as of 03/16/25 2342   Sun Mar 16, 2025   2331 Blood pressure much improved from that on arrival.  For recheck additionally with PCP.     X-ray images reviewed.  No fracture or bony lesion appreciated.    Critical Care Time  Procedures

## 2025-03-17 NOTE — ED PROVIDER NOTES
Time reflects when diagnosis was documented in both MDM as applicable and the Disposition within this note       Time User Action Codes Description Comment    3/16/2025 11:47 PM Brad Starks [M79.672] Foot pain, left     3/16/2025 11:48 PM Brad Starks [M54.9] Back pain           ED Disposition       ED Disposition   Discharge    Condition   Stable    Date/Time   Sun Mar 16, 2025 11:47 PM    Comment   Dandy Hernández discharge to home/self care.                   Assessment & Plan       Medical Decision Making  Male patient who presents to the emergency department for left foot pain.  On physical examination, patient was noted to have mild tenderness palpation of the base of the left first digit along with mild erythema.  Patient's labs showed elevated inflammatory markers including CRP and ESR.  Patient's 3 view left foot x-ray showed no acute osseous abnormality as per radiology.  While in the emergency department patient was given IV Tylenol with mild symptomatic improvement.  Plan was for patient to be discharged home and follow-up outpatient with PCP as well as given a referral for rheumatology.  Patient was instructed return to the emergency department if symptoms worsen.  Patient was agreeable to plan.  At time of discharge, patient is pending Lyme titer results.    Differential diagnoses include but not limited to acute injury or fracture, gout flare, cellulitis, or rheumatologic etiology.      Amount and/or Complexity of Data Reviewed  Labs: ordered. Decision-making details documented in ED Course.  Radiology: ordered and independent interpretation performed.    Risk  Prescription drug management.        ED Course as of 03/18/25 1544   Sun Mar 16, 2025   2258 Sed Rate(!): 64   2258 hs TnI 0hr: 3   2322 C-REACTIVE PROTEIN(!): 97.2   2347 hs TnI 2hr: 3   2347 Delta 2hr hsTnI: 0       Medications   acetaminophen (Ofirmev) injection 1,000 mg (0 mg Intravenous Stopped 3/16/25 2222)       ED Risk Strat  Scores                                                History of Present Illness       Chief Complaint   Patient presents with    Foot Pain     Pt states he has been diagnosed with gout to L foot in the past. Believes he's having a gout attack now because he's having increased pain to L foot. Also c/o L chest/back pain that started 1 week ago that comes and goes.       Past Medical History:   Diagnosis Date    Gout       Past Surgical History:   Procedure Laterality Date    FRACTURE SURGERY        History reviewed. No pertinent family history.   Social History     Tobacco Use    Smoking status: Never    Smokeless tobacco: Never   Vaping Use    Vaping status: Never Used   Substance Use Topics    Alcohol use: Yes     Comment: beer    Drug use: Never      E-Cigarette/Vaping    E-Cigarette Use Never User       E-Cigarette/Vaping Substances      I have reviewed and agree with the history as documented.     59-year-old male with significant PMH including gout who presents to the emergency department for left foot pain.  Patient states that for the past month he has been treated for gout flare that he has been having with minimal improvement in his symptoms.  He states that his left big toe has pain as well as mild amount of pain in his right big toe.  He has been treated with colchicine, allopurinol, indomethacin, and prednisone which she just finished 4 days ago.  He also mentions that he has adjusted his diet to decrease the amount of purines.  Also having pain in his left shoulder blade.  No other acute concerns at this time.  Last dose of medication prior to arrival emergency department was Motrin around 7:30 PM. Denies chest pain, SOB, cough, abdominal pain, n/v/d, fever, chills, dizziness, lightheadedness, HA, dysuria, hematuria, hematochezia, or melena.             Review of Systems   Constitutional:  Negative for appetite change, chills, diaphoresis, fatigue and fever.   HENT: Negative.  Negative for congestion,  ear discharge, ear pain, postnasal drip, rhinorrhea, sore throat and trouble swallowing.    Eyes: Negative.  Negative for pain and visual disturbance.   Respiratory: Negative.  Negative for cough and shortness of breath.    Cardiovascular: Negative.  Negative for chest pain.   Gastrointestinal: Negative.  Negative for abdominal pain, blood in stool, constipation, diarrhea, nausea and vomiting.   Genitourinary: Negative.  Negative for decreased urine volume, difficulty urinating, dysuria, flank pain and hematuria.   Musculoskeletal:  Positive for arthralgias. Negative for back pain.   Skin: Negative.  Negative for color change and rash.   Neurological: Negative.  Negative for dizziness, syncope, weakness, light-headedness and headaches.           Objective       ED Triage Vitals   Temperature Pulse Blood Pressure Respirations SpO2 Patient Position - Orthostatic VS   03/16/25 2051 03/16/25 2051 03/16/25 2051 03/16/25 2051 03/16/25 2051 03/16/25 2326   98.2 °F (36.8 °C) 84 (!) 191/102 18 98 % Lying      Temp Source Heart Rate Source BP Location FiO2 (%) Pain Score    03/16/25 2051 03/16/25 2051 03/16/25 2326 -- 03/16/25 2051    Oral Monitor Right arm  10 - Worst Possible Pain      Vitals      Date and Time Temp Pulse SpO2 Resp BP Pain Score FACES Pain Rating User   03/16/25 2326 -- 71 98 % 16 149/71 -- -- SG   03/16/25 2237 -- -- -- -- 192/86 -- -- SAZ   03/16/25 2051 98.2 °F (36.8 °C) 84 98 % 18 191/102 10 - Worst Possible Pain -- CH            Physical Exam  Vitals and nursing note reviewed.   Constitutional:       General: He is not in acute distress.     Appearance: Normal appearance. He is normal weight.   HENT:      Head: Normocephalic and atraumatic.      Right Ear: External ear normal.      Left Ear: External ear normal.      Nose: Nose normal.      Mouth/Throat:      Mouth: Mucous membranes are moist.      Pharynx: Oropharynx is clear.   Eyes:      Conjunctiva/sclera: Conjunctivae normal.   Pulmonary:       Effort: Pulmonary effort is normal.   Musculoskeletal:         General: Tenderness present. No swelling, deformity or signs of injury. Normal range of motion.      Cervical back: Normal range of motion.      Comments: Minimal tenderness to palpation of the base of the left first digit.   Skin:     General: Skin is warm and dry.      Findings: Erythema present.      Comments: Mild erythema to the base of the left first digit.   Neurological:      General: No focal deficit present.      Mental Status: He is alert and oriented to person, place, and time. Mental status is at baseline.         Results Reviewed       Procedure Component Value Units Date/Time    LYME TOTAL AB W REFLEX TO IGM/IGG [[id number]]  (Normal) Collected: 03/16/25 2301    Lab Status: Final result Specimen: Blood from Arm, Right Updated: 03/17/25 0858    Narrative:      The following orders were created for panel order LYME TOTAL AB W REFLEX TO IGM/IGG.  Procedure                               Abnormality         Status                     ---------                               -----------         ------                     Lyme Total AB W Reflex t...[[id number]]  Normal              Final result                 Please view results for these tests on the individual orders.    Lyme Total AB W Reflex to IGM/IGG [[id number]]  (Normal) Collected: 03/16/25 2301    Lab Status: Final result Specimen: Blood from Arm, Right Updated: 03/17/25 0858     Lyme Total Antibodies Negative    HS Troponin I 2hr [[id number]]  (Normal) Collected: 03/16/25 2302    Lab Status: Final result Specimen: Blood from Arm, Right Updated: 03/16/25 2338     hs TnI 2hr 3 ng/L      Delta 2hr hsTnI 0 ng/L     C-reactive protein [[id number]]  (Abnormal) Collected: 03/16/25 2053    Lab Status: Final result Specimen: Blood from Arm, Right Updated: 03/16/25 2309     CRP 97.2 mg/L     HS Troponin 0hr (reflex protocol) [[id number]]  (Normal) Collected: 03/16/25 2053    Lab Status: Final result  Specimen: Blood from Arm, Right Updated: 03/16/25 2257     hs TnI 0hr 3 ng/L     Sedimentation rate, automated [[id number]]  (Abnormal) Collected: 03/16/25 2053    Lab Status: Final result Specimen: Blood from Arm, Right Updated: 03/16/25 2249     Sed Rate 64 mm/hour     Comprehensive metabolic panel [[id number]] Collected: 03/16/25 2053    Lab Status: Final result Specimen: Blood from Arm, Right Updated: 03/16/25 2113     Sodium 138 mmol/L      Potassium 3.9 mmol/L      Chloride 105 mmol/L      CO2 25 mmol/L      ANION GAP 8 mmol/L      BUN 21 mg/dL      Creatinine 1.11 mg/dL      Glucose 114 mg/dL      Calcium 9.2 mg/dL      AST 24 U/L      ALT 37 U/L      Alkaline Phosphatase 62 U/L      Total Protein 7.2 g/dL      Albumin 4.0 g/dL      Total Bilirubin 0.33 mg/dL      eGFR 73 ml/min/1.73sq m     Narrative:      National Kid[employer] Disease Foundation guidelines for Chronic Kidney Disease (CKD):     Stage 1 with normal or high GFR (GFR > 90 mL/min/1.73 square meters)    Stage 2 Mild CKD (GFR = 60-89 mL/min/1.73 square meters)    Stage 3A Moderate CKD (GFR = 45-59 mL/min/1.73 square meters)    Stage 3B Moderate CKD (GFR = 30-44 mL/min/1.73 square meters)    Stage 4 Severe CKD (GFR = 15-29 mL/min/1.73 square meters)    Stage 5 End Stage CKD (GFR <15 mL/min/1.73 square meters)  Note: GFR calculation is accurate only with a steady state creatinine    CBC and differential [[id number]] Collected: 03/16/25 2053    Lab Status: Final result Specimen: Blood from Arm, Right Updated: 03/16/25 2101     WBC 8.64 Thousand/uL      RBC 4.33 Million/uL      Hemoglobin 13.0 g/dL      Hematocrit 38.7 %      MCV 89 fL      MCH 30.0 pg      MCHC 33.6 g/dL      RDW 13.5 %      MPV 9.4 fL      Platelets 247 Thousands/uL      nRBC 0 /100 WBCs      Segmented % 61 %      Immature Grans % 1 %      Lymphocytes % 23 %      Monocytes % 12 %      Eosinophils Relative 3 %      Basophils Relative 0 %      Absolute Neutrophils 5.34 Thousands/µL       Absolute Immature Grans 0.04 Thousand/uL      Absolute Lymphocytes 1.98 Thousands/µL      Absolute Monocytes 0.99 Thousand/µL      Eosinophils Absolute 0.27 Thousand/µL      Basophils Absolute 0.02 Thousands/µL             XR foot 3+ views LEFT   ED Interpretation by Brad Starks MD (03/16 1484)   Image was independently interpreted by myself and showed no acute concerns or fractures at this time.        Final Interpretation by Pham Bowles MD (03/17 9334)      No acute osseous abnormality.   No radiographic findings of gouty arthritis      Computerized Assisted Algorithm (CAA) may have been used to analyze all applicable images.         Workstation performed: [id number]             ECG 12 Lead Documentation Only    Date/Time: 3/16/2025 8:53 PM    Performed by: Brad Starks MD  Authorized by: Brad Starks MD    ECG reviewed by me, the ED Provider: yes    Patient location:  ED  Previous ECG:     Previous ECG:  Compared to current    Similarity:  No change  Interpretation:     Interpretation: normal    Rate:     ECG rate:  85    ECG rate assessment: normal    Rhythm:     Rhythm: sinus rhythm    Ectopy:     Ectopy: none    QRS:     QRS axis:  Normal    QRS intervals:  Normal  Conduction:     Conduction: normal    ST segments:     ST segments:  Normal  T waves:     T waves: normal        ED Medication and Procedure Management   None     Discharge Medication List as of 3/16/2025 11:51 PM        START taking these medications    Details   predniSONE 10 mg tablet Multiple Dosages:Starting Sun 3/16/2025, Until Thu 3/20/2025 at 2359, THEN Starting Fri 3/21/2025, Until Sat 3/22/2025 at 2359, THEN Starting Sun 3/23/2025, Until Mon 3/24/2025 at 2359, THEN Starting Tue 3/25/2025, Until Tue 3/25/2025 at 2359Take 4  tablets (40 mg total) by mouth daily for 5 days, THEN 3 tablets (30 mg total) daily for 2 days, THEN 2 tablets (20 mg total) daily for 2 days, THEN 1 tablet (10 mg total) daily for 1 day., Normal             ED  SEPSIS DOCUMENTATION   Time reflects when diagnosis was documented in both MDM as applicable and the Disposition within this note       Time User Action Codes Description Comment    3/16/2025 11:47 PM Brad Starks [M79.672] Foot pain, left     3/16/2025 11:48 PM Brad Starks [M54.9] Back pain                  Brad Starks MD  03/18/25 154

## 2025-03-17 NOTE — TELEPHONE ENCOUNTER
Patient's spouse called to schedule an appointment with Dr. Armstrong. I advised her that Dr. Armstrong is not available for a new patient visit in [place] or [place] until late June - early July. She asked if we had anything available today and I advised her that in all of our office locations we do not have anything available for today. I offered next Monday (3/24) as that was our next available. She stated she will have to call back and ended the call.       Thank you.